# Patient Record
Sex: FEMALE | NOT HISPANIC OR LATINO | Employment: UNEMPLOYED | URBAN - METROPOLITAN AREA
[De-identification: names, ages, dates, MRNs, and addresses within clinical notes are randomized per-mention and may not be internally consistent; named-entity substitution may affect disease eponyms.]

---

## 2018-01-23 ENCOUNTER — OFFICE VISIT CONVERTED (OUTPATIENT)
Dept: CARDIOLOGY | Facility: CLINIC | Age: 38
End: 2018-01-23
Attending: SPECIALIST

## 2018-03-30 ENCOUNTER — OFFICE VISIT CONVERTED (OUTPATIENT)
Dept: FAMILY MEDICINE CLINIC | Facility: CLINIC | Age: 38
End: 2018-03-30
Attending: FAMILY MEDICINE

## 2018-06-08 ENCOUNTER — CONVERSION ENCOUNTER (OUTPATIENT)
Dept: FAMILY MEDICINE CLINIC | Facility: CLINIC | Age: 38
End: 2018-06-08

## 2018-06-08 ENCOUNTER — OFFICE VISIT CONVERTED (OUTPATIENT)
Dept: FAMILY MEDICINE CLINIC | Facility: CLINIC | Age: 38
End: 2018-06-08
Attending: FAMILY MEDICINE

## 2019-03-13 ENCOUNTER — HOSPITAL ENCOUNTER (OUTPATIENT)
Dept: GENERAL RADIOLOGY | Facility: HOSPITAL | Age: 39
Discharge: HOME | End: 2019-03-13
Attending: OBSTETRICS & GYNECOLOGY

## 2021-03-16 ENCOUNTER — CONVERSION ENCOUNTER (OUTPATIENT)
Dept: GASTROENTEROLOGY | Facility: CLINIC | Age: 41
End: 2021-03-16

## 2021-03-16 ENCOUNTER — OFFICE VISIT CONVERTED (OUTPATIENT)
Dept: GASTROENTEROLOGY | Facility: CLINIC | Age: 41
End: 2021-03-16
Attending: INTERNAL MEDICINE

## 2021-03-30 ENCOUNTER — HOSPITAL ENCOUNTER (OUTPATIENT)
Dept: GASTROENTEROLOGY | Facility: HOSPITAL | Age: 41
Setting detail: HOSPITAL OUTPATIENT SURGERY
Discharge: HOME | End: 2021-03-30
Attending: INTERNAL MEDICINE

## 2021-03-30 LAB
GLUCOSE BLD-MCNC: 121 MG/DL (ref 65–99)
HCG UR QL: NEGATIVE

## 2021-05-07 NOTE — PROGRESS NOTES
Progress Note      Patient Name: Jackie Watson   Patient ID: [id number]   Sex: Female   YOB: 1980        Visit Date: June 8, 2018    Provider: Jazmin Marie MD   Location: [facility]   Location Address: [address] [place] [address]  [id number]   Location Phone: [phone]          Chief Complaint     Patient wanting to discuss an increase in her Celexa dose following the death of her father       History Of Present Illness  Jackie Watson is a 37 year old /White female who presents for evaluation and treatment of:      Medication change    Patient comes in for increased anxiety and depression for the past 2 weeks. She lost her dad 1week ago but the week before he was in the ICU. She notes mostly more anxiety but also an increase in depression. Denies manic episodes. Denies SI/HI. Denies panic attacks. She has had intentional weight loss of about 40# since her gastric sleeve surgery in April.          Past Medical History  Disease Name Date Onset Notes   Depression with anxiety --  --    Diabetes --  --    Seasonal allergies --  --          Past Surgical History  Procedure Name Date Notes   Gallbladder removal --  when patient was 15         Medication List  Name Date Started Instructions   citalopram 20 mg oral tablet 03/30/2018 take 1 tablet (20 mg) by oral route once daily for anxiety   levocetirizine 5 mg oral tablet 03/30/2018 take 1 tablet (5 mg) by oral route once daily for allergies   montelukast 10 mg oral tablet 03/30/2018 take 1 tablet (10 mg) by oral route once daily for your allergies   Novolog Flexpen 100 unit/mL subcutaneous insulin pen  inject by subcutaneous route per prescriber's instructions. Insulin dosing requires individualization.   Tresiba FlexTouch U-100 100 unit/mL (3 mL) subcutaneous insulin pen  inject by subcutaneous route as per insulin protocol         Allergy List  Allergen Name Date Reaction Notes   NO KNOWN DRUG ALLERGIES  "--  --  --          Social History  Finding Status Start/Stop Quantity Notes   Tobacco Former 16/34 <1 pack per day Quit 5 years ago         Review of Systems  · Constitutional  o Denies  o : fever, headache, chills  · Eyes  o Denies  o : eye pain, changes in vision, double vision  · HENT  o Denies  o : nasal congestion, nasal discharge, postnasal drip, sore throat, tinnitus, ear pain  · Breasts  o Denies  o : lumps  · Cardiovascular  o Denies  o : chest pain, palpitations  · Respiratory  o Denies  o : shortness of breath, wheezing, cough  · Gastrointestinal  o Denies  o : nausea, vomiting, abdominal pain, blood in stools  · Genitourinary  o Denies  o : urgency, frequency, dysuria, hematuria  · Integument  o Denies  o : rash  · Psychiatric  o Admits  o : anxiety, depression  o Denies  o : difficulty sleeping, impulsive behaviors, suicidal ideation, homicidal ideation      Vitals  Date Time BP Position Site L\R Cuff Size HR RR TEMP(F) WT  HT  BMI kg/m2 BSA m2 O2 Sat        06/08/2018 09:02 /84 Sitting    82 - R  98.3 295lbs 0oz 5'  7\" 46.2 2.52 96 %           Physical Examination  · Constitutional  o Appearance  o : well developed, well-nourished, no acute distress  · Eyes  o Conjunctivae  o : conjunctivae normal  · Ears, Nose, Mouth and Throat  o Nose  o :   § External Nose  § : no lesions noted  · Neck  o Inspection/Palpation  o : normal appearance, trachea midline, neck supple  · Respiratory  o Respiratory Effort  o : breathing unlabored  o Inspection of Chest  o : chest rise symmetric bilaterally  o Auscultation of Lungs  o : clear to auscultation bilaterally throughout inspiration and expiration; no wheezes, rhonchi, rales, rubs  · Cardiovascular  o Heart  o :   § Auscultation of Heart  § : regular rate and rhythm, no murmurs, gallops or rubs  · Psychiatric  o Judgement and Insight  o : judgement and insight intact  o Thought Processes  o : rate of thoughts normal, thought content logical  o Mood and " Affect  o : mood normal, affect appropriate  o Presence of Abnormal Thoughts  o : no homicidal ideation, no suicidal ideation              Assessment  · Depression with anxiety     300.4/F41.8  PHQ9 and GAD7 scores both higher this visit. Discussed that grief reaction can last up to 6 weeks. Will increase celexa during that time and re-evaluate in one month. Patient denies SI/HI.       Plan  · Orders  o ACO-39: Current medications updated and reviewed () - - 06/08/2018  · Medications  o citalopram 40 mg oral tablet   SIG: take 1 tablet (40 mg) by oral route once daily for anxiety/depression   DISP: (30) tablets with 0 refills  Prescribed on 06/08/2018     o citalopram 20 mg oral tablet   SIG: take 1 tablet (20 mg) by oral route once daily for anxiety   DISP: (30) tablet with 5 refills  Discontinued on 06/08/2018     · Instructions  o Patient was educated/instructed on their diagnosis, treatment and medications prior to discharge from the clinic today.  · Disposition  o Call or Return if symptoms worsen or persist.            Electronically Signed by: Jazmin Marie MD -Author on June 8, 2018 09:42:57 AM

## 2021-05-07 NOTE — PROGRESS NOTES
Progress Note      Patient Name: Jackie Watson   Patient ID: [id number]   Sex: Female   YOB: 1980        Visit Date: March 30, 2018    Provider: Jazmin Marie MD   Location: [facility]   Location Address: [address] [place] [address]  [id number]   Location Phone: [phone]          Chief Complaint     Medication refills       History Of Present Illness  Jackie Watson is a 37 year old /White female who presents for evaluation and treatment of:      Rx refills    Patient comes in for a refill on celexa for anxiety and xyzal and singulair for her allergies. She has been on celexa about 3 years. No SI/HI. Getting allergy shots so hoping to stop allergy meds this summer after allergy shots are completed. She will be getting the gastric sleeve on 18Apr2018.         Past Medical History  Disease Name Date Onset Notes   Depression with anxiety --  --    Diabetes --  --    Seasonal allergies --  --          Past Surgical History  Procedure Name Date Notes   Gallbladder removal --  when patient was 15         Medication List  Name Date Started Instructions   citalopram 20 mg oral tablet 03/30/2018 take 1 tablet (20 mg) by oral route once daily for anxiety   levocetirizine 5 mg oral tablet 03/30/2018 take 1 tablet (5 mg) by oral route once daily for allergies   lisinopril 10 mg oral tablet  take 0.5 tablet (5 mg) by oral route once daily   metformin 500 mg oral tablet  take 1 tablet (500 mg) by oral route 2 times per day with morning and evening meals   montelukast 10 mg oral tablet 03/30/2018 take 1 tablet (10 mg) by oral route once daily for your allergies   Novolog Flexpen 100 unit/mL subcutaneous insulin pen  inject by subcutaneous route per prescriber's instructions. Insulin dosing requires individualization.   pravastatin 10 mg oral tablet  take 1 tablet (10 mg) by oral route once daily   Tamiflu 75 mg oral capsule 02/05/2018 take 1 capsule (75 mg) by oral  route once daily for 10 days   Tresiba FlexTouch U-100 100 unit/mL (3 mL) subcutaneous insulin pen  inject by subcutaneous route as per insulin protocol         Allergy List  Allergen Name Date Reaction Notes   NO KNOWN DRUG ALLERGIES --  --  --          Social History  Finding Status Start/Stop Quantity Notes   Tobacco Former 16/34 <1 pack per day Quit 5 years ago         Review of Systems  · Constitutional  o Denies  o : fever, headache, chills  · Eyes  o Denies  o : eye pain, changes in vision, double vision  · HENT  o Denies  o : nasal congestion, nasal discharge, postnasal drip, sore throat, tinnitus, ear pain  · Breasts  o Denies  o : lumps  · Cardiovascular  o Denies  o : chest pain, palpitations  · Respiratory  o Denies  o : shortness of breath, wheezing, cough  · Gastrointestinal  o Denies  o : nausea, vomiting, abdominal pain, blood in stools  · Genitourinary  o Denies  o : urgency, frequency, dysuria, hematuria  · Integument  o Denies  o : rash  · Psychiatric  o Denies  o : suicidal ideation, homicidal ideation      Vitals  Date Time BP Position Site L\R Cuff Size HR RR TEMP(F) WT  HT  BMI kg/m2 BSA m2 O2 Sat HC       03/30/2018 08:59 /82 Sitting    94 - R  98.2 332lbs 0oz    96 %           Physical Examination  · Constitutional  o Appearance  o : well developed, well-nourished, no acute distress  · Eyes  o Conjunctivae  o : conjunctivae normal  · Ears, Nose, Mouth and Throat  o Nose  o :   § External Nose  § : no lesions noted  · Neck  o Inspection/Palpation  o : normal appearance, trachea midline, neck supple  · Respiratory  o Respiratory Effort  o : breathing unlabored  o Inspection of Chest  o : chest rise symmetric bilaterally  o Auscultation of Lungs  o : clear to auscultation bilaterally throughout inspiration and expiration; no wheezes, rhonchi, rales, rubs  · Cardiovascular  o Heart  o :   § Auscultation of Heart  § : regular rate and rhythm, no murmurs, gallops or  rubs  · Psychiatric  o Mood and Affect  o : mood normal, affect appropriate              Assessment  · Depression with anxiety     300.4/F41.8  GAD7: 15 before celexa and 8 with celexa. Well controlled and will refill medication. She denies SI/HI.  · Seasonal allergies     477.9/J30.2  Will refill meds.       Plan  · Orders  o ACO-39: Current medications updated and reviewed () - - 03/30/2018  · Instructions  o Patient was educated/instructed on their diagnosis, treatment and medications prior to discharge from the clinic today.            Electronically Signed by: Jazmin Marie MD -Author on March 30, 2018 09:29:44 AM

## 2021-05-09 VITALS
BODY MASS INDEX: 45.99 KG/M2 | DIASTOLIC BLOOD PRESSURE: 84 MMHG | HEART RATE: 82 BPM | WEIGHT: 293 LBS | HEIGHT: 67 IN | OXYGEN SATURATION: 96 % | TEMPERATURE: 98.3 F | SYSTOLIC BLOOD PRESSURE: 124 MMHG

## 2021-05-09 VITALS
DIASTOLIC BLOOD PRESSURE: 82 MMHG | WEIGHT: 293 LBS | OXYGEN SATURATION: 96 % | TEMPERATURE: 98.2 F | SYSTOLIC BLOOD PRESSURE: 124 MMHG | HEART RATE: 94 BPM

## 2021-05-10 NOTE — H&P
History and Physical      Patient Name: Jackie Watson   Patient ID: [id number]   Sex: Female   YOB: 1980    Primary Care Provider: Bhargavi Rogers MD   Referring Provider: Bhargavi Rogers MD    Visit Date: March 16, 2021    Provider: Chandler Cordero MD   Location: [facility]  [place]   Location Address: [address] [place] [address]  [id number]   Location Phone: [phone]          Chief Complaint  · Gastroesophageal reflux     Prior gastric sleeve  Nocturnal regurgitation       History Of Present Illness  The patient is a 40 year old /White female, who presents on referral from Bhargavi Rogers MD, for a gastroenterology evaluation of GERD. She describes episodes of heartburn and sour eructations, which began 6 months ago. The patient's heartburn is exacerbated by spicy food and lying down at night, and when it occurs it lasts a variable amount of time. It is improved by PPI's and Liquid Anti-acids. The patient is overweight. The patient has been on therapy for Acid Reflux. They have taken Protonix. It has been partially successful in relieving their symptoms.   Radiographic Studies:  She has undergone no recent radiographic imaging.   Diagnostic studies /Procedures:  Relevant procedures performed to date: none.      The patient had gastric sleeve done in 2018 and lost significant amount of weight however in the recent quarantine she has gained back about 25 pounds.  Her reflux symptoms started about 6 months ago and in the past month has been extremely worse.  She is now having to sit in the recliner to try to sleep.  Otherwise she describes nocturnal regurgitation of acid contents almost nightly.    The patient has a long history of pancreatic pseudocyst as a 15-year-old secondary to gallstone pancreatitis.  She has had multiple ERCPs in the past with stent placement.  She now has diabetes.       Past Medical History  Body mass index 45.0-49.9, adult; Diabetes; Morbid  "obesity         Past Surgical History  Cervical cone biopsy; Cholecstectomy; EGD; ERCP; Sleeve gastrectomy using single incision laparoscopic surgery (SILS)         Medication List  Celexa 40 mg oral tablet; Pepcid 40 mg oral tablet; Protonix 40 mg oral tablet,delayed release (DR/EC); Topamax 100 mg oral tablet; Wellbutrin  mg oral tablet sustained-release 12 hr         Allergy List  NO KNOWN DRUG ALLERGIES       Allergies Reconciled  Family Medical History  Breast Neoplasm, Malignant; Heart Disease; Asthma; Chronic Obstructive Pulmonary Disease; - No Family History of Colorectal Cancer         Social History  Alcohol (Never); Tobacco (Former)         Review of Systems  · Constitutional  o Denies  o : chills, fever  · Eyes  o Denies  o : blurred vision, vision changes  · Cardiovascular  o Denies  o : exertional chest pain  · Respiratory  o Denies  o : shortness of breath  · Gastrointestinal  o Denies  o : nausea, vomiting  · Genitourinary  o Denies  o : dysuria, blood in urine  · Integument  o Denies  o : skin rash  · Neurologic  o Denies  o : numbness or tingling  · Musculoskeletal  o Denies  o : joint pain  · Endocrine  o Denies  o : weight gain, weight loss  · Psychiatric  o Denies  o : anxiety, depression      Vitals  Date Time BP Position Site L\R Cuff Size HR RR TEMP (F) WT  HT  BMI kg/m2 BSA m2 O2 Sat FR L/min FiO2 HC       03/16/2021 02:17 /89 Sitting    87 - R 12  265lbs 2oz 5'  6.5\" 42.15 2.38 99 %            Physical Examination  · Constitutional  o Appearance  o : Well-nourished, well developed, alert, in no acute distress, alert and oriented X 3.  · Eyes  o Vision  o :   § Visual Fields  § : eyes move symmetrical in all directions  o Sclerae  o : sclerae anicteric  o Pupils and Irises  o : pupils equal and symetrical  · Neck  o Inspection/Palpation  o : Trachea is midline, no adenopathy  o Thyroid  o : Thyroid is not enlarged  · Respiratory  o Respiratory Effort  o : Breathing is " unlabored.  o Inspection of Chest  o : normal appearance, no retractions  o Auscultation of Lungs  o : Chest is clear to auscultation bilaterally.  · Cardiovascular  o Heart  o :   § Auscultation of Heart  § : no murmurs, rubs, or gallops. Regular rate and rhythm  · Gastrointestinal  o Abdominal Examination  o : Abdomen is soft, nontender to palpation, with normal active bowel sounds, no appreciable hepatosplenomegaly.  · Genitourinary  o Digital Rectal Examination  o : Deferred  · Musculoskeletal  o Appearance  o : Gait is normal. There is no clubbing, cyanosis or edema.   · Skin and Subcutaneous Tissue  o General Inspection  o : Skin is without focal lesions. Skin turgor is normal.  · Psychiatric  o Mood and Affect  o : Mood and affect are appropriate to circumstances.          Assessment  · Esophageal Reflux     530.81/K21.9      Plan  · Orders  o Consent for Esophagogastrodudodenoscopy (EGD) with dilatation -Possible risk/complications, benefits, and alternatives to surgical or invasive procedure have been explained to patient and/or legal gaurdian. -Patient has been evaluated and can tolerate anesthesia and/or sedation. Risk, benefits, and alternatives to anesthesia and sedation have been explained to patient and/or legal gaurdian. ([id number]) - - 03/16/2021  · Medications  o Medications have been Reconciled  o Transition of Care or Provider Policy  · Instructions  o Patient had gastric sleeve in 2018 with Dr. Jasmine however in the past 6 months she has had significant reflux symptoms and even nocturnal regurgitation of gastric contents. She was started on Protonix taking it at night as well as Pepcid but has not had significant improvement after 1 month of therapy. Some of her symptoms may be attributed to a recent 25 pound weight regain. I will therefore schedule her for an upper endoscopy. I recommended she take Protonix first thing in the morning and changed to Pepcid 40 mg at night. We discussed resuming her  postsurgical diet.            Electronically Signed by: Chandler Cordero MD -Author on March 16, 2021 03:03:45 PM

## 2021-05-14 VITALS
HEART RATE: 87 BPM | HEIGHT: 66 IN | BODY MASS INDEX: 42.61 KG/M2 | RESPIRATION RATE: 12 BRPM | OXYGEN SATURATION: 99 % | DIASTOLIC BLOOD PRESSURE: 89 MMHG | WEIGHT: 265.12 LBS | SYSTOLIC BLOOD PRESSURE: 116 MMHG

## 2021-05-16 VITALS
HEART RATE: 92 BPM | SYSTOLIC BLOOD PRESSURE: 136 MMHG | HEIGHT: 66 IN | BODY MASS INDEX: 47.09 KG/M2 | WEIGHT: 293 LBS | DIASTOLIC BLOOD PRESSURE: 96 MMHG

## 2022-05-16 ENCOUNTER — OFFICE VISIT (OUTPATIENT)
Dept: OBSTETRICS AND GYNECOLOGY | Facility: CLINIC | Age: 42
End: 2022-05-16

## 2022-05-16 VITALS
HEART RATE: 88 BPM | SYSTOLIC BLOOD PRESSURE: 134 MMHG | BODY MASS INDEX: 43.71 KG/M2 | DIASTOLIC BLOOD PRESSURE: 89 MMHG | HEIGHT: 66 IN | WEIGHT: 272 LBS

## 2022-05-16 DIAGNOSIS — Z01.419 WELL WOMAN EXAM: Primary | ICD-10-CM

## 2022-05-16 DIAGNOSIS — Z12.31 ENCOUNTER FOR SCREENING MAMMOGRAM FOR MALIGNANT NEOPLASM OF BREAST: ICD-10-CM

## 2022-05-16 PROCEDURE — 87624 HPV HI-RISK TYP POOLED RSLT: CPT | Performed by: NURSE PRACTITIONER

## 2022-05-16 PROCEDURE — 99396 PREV VISIT EST AGE 40-64: CPT | Performed by: NURSE PRACTITIONER

## 2022-05-16 PROCEDURE — G0123 SCREEN CERV/VAG THIN LAYER: HCPCS | Performed by: NURSE PRACTITIONER

## 2022-05-16 RX ORDER — DEXTROAMPHETAMINE SACCHARATE, AMPHETAMINE ASPARTATE MONOHYDRATE, DEXTROAMPHETAMINE SULFATE AND AMPHETAMINE SULFATE 5; 5; 5; 5 MG/1; MG/1; MG/1; MG/1
20 CAPSULE, EXTENDED RELEASE ORAL EVERY MORNING
COMMUNITY
Start: 2022-04-28

## 2022-05-16 RX ORDER — PANTOPRAZOLE SODIUM 40 MG/1
40 TABLET, DELAYED RELEASE ORAL DAILY
COMMUNITY
Start: 2022-03-29

## 2022-05-16 RX ORDER — FLUTICASONE PROPIONATE 50 MCG
SPRAY, SUSPENSION (ML) NASAL
COMMUNITY
Start: 2022-03-22

## 2022-05-16 RX ORDER — INSULIN DEGLUDEC INJECTION 100 U/ML
INJECTION, SOLUTION SUBCUTANEOUS
COMMUNITY
Start: 2022-02-17

## 2022-05-16 RX ORDER — BUPROPION HYDROCHLORIDE 150 MG/1
150 TABLET, EXTENDED RELEASE ORAL EVERY MORNING
COMMUNITY
Start: 2022-03-29

## 2022-05-16 RX ORDER — TIZANIDINE 4 MG/1
4 TABLET ORAL EVERY 8 HOURS PRN
COMMUNITY
Start: 2022-03-17

## 2022-05-16 RX ORDER — CITALOPRAM 40 MG/1
40 TABLET ORAL DAILY
COMMUNITY
Start: 2022-03-29

## 2022-05-16 RX ORDER — SEMAGLUTIDE 1.34 MG/ML
INJECTION, SOLUTION SUBCUTANEOUS
COMMUNITY
Start: 2022-04-21

## 2022-05-16 RX ORDER — INSULIN ASPART 100 [IU]/ML
INJECTION, SOLUTION INTRAVENOUS; SUBCUTANEOUS
COMMUNITY

## 2022-05-16 RX ORDER — FAMOTIDINE 40 MG/1
40 TABLET, FILM COATED ORAL
COMMUNITY
Start: 2022-03-29

## 2022-05-16 NOTE — PROGRESS NOTES
"  HPI:   41 y.o..Presents for well woman exam. Contraception or HRT: Contraception:  Vasectomy   Menses:   q month, lasts 4 days, changes products q 2 hrs on heaviest days.   Pain:  Mild, OTC meds control discomfort  Last pap normal   Complaints: Pt has no complaints today.  Patient reports that she is not currently experiencing any symptoms of urinary incontinence.      Past Medical History:   Diagnosis Date   • Acid reflux    • ADD (attention deficit disorder)    • Anxiety    • Depression    • Type 1 diabetes (HCC)       Past Surgical History:   Procedure Laterality Date   • CERVICAL BIOPSY  W/ LOOP ELECTRODE EXCISION         • GASTRIC SLEEVE LAPAROSCOPIC     • NECK SURGERY        Family History   Problem Relation Age of Onset   • Breast cancer Mother 46   • Uterine cancer Maternal Grandmother 72        PCP: does manage PMHx and preventative labs    PHYSICAL EXAM: Chaperone present /89   Pulse 88   Ht 167.6 cm (66\")   Wt 123 kg (272 lb)   LMP 2022   Breastfeeding No   BMI 43.90 kg/m²   General- NAD, alert and oriented, appropriate  Psych- Normal mood, good memory  Neck- No masses, no thyroid enlargement  Lymphatic- No palpable neck, axillary, or groin nodes  CV- Regular rhythm, no murmurs  Resp- CTA to bases, no wheezes  Abdomen- Soft, non distended, non tender, no masses  Breast left-  Bilaterally symmetrical, no masses, non tender, no nipple discharge  Breast right- Bilaterally symmetrical, no masses, non tender, no nipple discharge  External genitalia- Normal female, no lesions  Urethra/meatus- Normal, no masses, non tender, no prolapse  Bladder- Normal, no masses, non tender, no prolapse  Vagina- Normal, no atrophy, no lesions, no discharge, no prolapse  Cvx- Normal, no lesions, no discharge, No cervical motion tenderness  Uterus- Normal size, shape & consistency.  Non tender, mobile, & no prolapse  Adnexa- No mass, non tender  Anus/Rectum/Perineum- Not performed  Ext- No edema, no " cyanosis    Skin- No lesions, no rashes, no acanthosis nigricans       ASSESSMENT and PLAN:    Diagnoses and all orders for this visit:    1. Well woman exam (Primary)  -     [facility] Hereditary Cancer Screen  -     IgP, Aptima HPV    2. Encounter for screening mammogram for malignant neoplasm of breast  -     Mammo Screening Digital Tomosynthesis Bilateral With CAD; Future        Preventative:   BREAST HEALTH- Monthly self breast exam importance and how to reviewed. MMG and/or MRI (prn) reviewed per society guidelines and her individual history. Screen: Updated today  CERVICAL CANCER Screening- Reviewed current ASCCP guidelines for screening w and wo cotest HPV, age specific.  Screen: Updated today, desires to continue annual PAP  COLON CANCER Screening- Reviewed current medical society guidelines and options.  Screen:  Not medically needed  Follow up PCP/Specialist PMHx and Labs  [facility]: Counseled and evaluated for genetic testing.  Testing accepted.      She understands the importance of having any ordered tests to be performed in a timely fashion.  The risks of not performing them include, but are not limited to, advanced cancer stages, bone loss from osteoporosis and/or subsequent increase in morbidity and/or mortality.  She is encouraged to review her results online and/or contact or office if she has questions.     Follow Up:  Return for 6 wks [employer].        Radhika Johnson, APRN  05/16/2022

## 2022-05-19 ENCOUNTER — TRANSCRIBE ORDERS (OUTPATIENT)
Dept: ADMINISTRATIVE | Facility: HOSPITAL | Age: 42
End: 2022-05-19

## 2022-05-19 DIAGNOSIS — Z12.31 SCREENING MAMMOGRAM FOR HIGH-RISK PATIENT: Primary | ICD-10-CM

## 2022-05-21 LAB
CYTOLOGIST CVX/VAG CYTO: NORMAL
CYTOLOGY CVX/VAG DOC CYTO: NORMAL
CYTOLOGY CVX/VAG DOC THIN PREP: NORMAL
DX ICD CODE: NORMAL
HIV 1 & 2 AB SER-IMP: NORMAL
HPV I/H RISK 4 DNA CVX QL PROBE+SIG AMP: NEGATIVE
OTHER STN SPEC: NORMAL
STAT OF ADQ CVX/VAG CYTO-IMP: NORMAL

## 2022-05-24 ENCOUNTER — HOSPITAL ENCOUNTER (OUTPATIENT)
Dept: MAMMOGRAPHY | Facility: HOSPITAL | Age: 42
Discharge: HOME | End: 2022-05-24
Admitting: NURSE PRACTITIONER

## 2022-05-24 DIAGNOSIS — Z12.31 SCREENING MAMMOGRAM FOR HIGH-RISK PATIENT: ICD-10-CM

## 2022-05-24 PROCEDURE — 77067 SCR MAMMO BI INCL CAD: CPT

## 2022-05-31 LAB — REF LAB TEST METHOD: NORMAL

## 2022-08-01 ENCOUNTER — TELEPHONE (OUTPATIENT)
Dept: OBSTETRICS AND GYNECOLOGY | Facility: CLINIC | Age: 42
End: 2022-08-01

## 2022-08-01 ENCOUNTER — OFFICE VISIT (OUTPATIENT)
Dept: OBSTETRICS AND GYNECOLOGY | Facility: CLINIC | Age: 42
End: 2022-08-01

## 2022-08-01 VITALS
DIASTOLIC BLOOD PRESSURE: 92 MMHG | WEIGHT: 257 LBS | SYSTOLIC BLOOD PRESSURE: 130 MMHG | BODY MASS INDEX: 41.48 KG/M2 | HEART RATE: 102 BPM

## 2022-08-01 DIAGNOSIS — Z91.89 INCREASED RISK OF BREAST CANCER: Primary | ICD-10-CM

## 2022-08-01 DIAGNOSIS — R03.0 ELEVATED BLOOD PRESSURE READING: ICD-10-CM

## 2022-08-01 PROCEDURE — 99212 OFFICE O/P EST SF 10 MIN: CPT | Performed by: NURSE PRACTITIONER

## 2022-08-01 NOTE — PROGRESS NOTES
[facility] FU      CC: Follow up genetic testing    HPI: Increased risk, [facility] Lifetime Breast Cancer risk 21.4%, TC Lifetime Breast Cancer risk 21.7%    PHYSICAL EXAM:  /92   Pulse 102   Wt 117 kg (257 lb)   LMP 07/17/2022 (Exact Date)   BMI 41.48 kg/m²   General- NAD, alert and oriented, appropriate  Psych- Normal mood, good memory    ASSESSMENT and PLAN:    Diagnoses and all orders for this visit:    1. Increased risk of breast cancer (Primary)    2. Elevated blood pressure reading      Counseling: Refer tp myriad report.  Copy of results and Plan provided. Results and any recommendations for FU and/or early detection and/or prevention reviewed. Patient concerns were discussed and answered. If VUS present reviewed importance of keeping active POC if status changes.  FU PCP elevated blood pressure        Follow Up:  Return if symptoms worsen or fail to improve.    I spent 15 minutes caring for Jackie on this date of service. This time includes time spent by me in the following activities:reviewing tests, counseling and educating the patient/family/caregiver, ordering medications, tests, or procedures and documenting information in the medical record        ANTONINA Villalta  08/01/2022

## 2022-08-15 ENCOUNTER — TELEPHONE (OUTPATIENT)
Dept: OBSTETRICS AND GYNECOLOGY | Facility: CLINIC | Age: 42
End: 2022-08-15

## 2023-05-16 NOTE — PROGRESS NOTES
Well Woman Visit    CC: Scheduled annual well gyn visit  Chief Complaint   Patient presents with   • WWE       [facility] intake in the past?: yes 22  Increased risk, [facility] Lifetime Breast Cancer risk 21.4%, TC Lifetime Breast Cancer risk 21.7%    HPI:   42 y.o.   Social History     Substance and Sexual Activity   Sexual Activity Yes   • Partners: Male   • Birth control/protection: Vasectomy    Comment: VAS.       43 y/o  with LMP 23 here for AP.  Menses q mo x 3-4 days with 1 day heavy.  Needs mammogram.  Needs colonocsopy  Pt has no complaints today.    PCP: does manage PMHx and preventative labs  History: PMHx, Meds, Allergies, PSHx, Social Hx, and POBHx all reviewed and updated.    PHYSICAL EXAM:  /85   Pulse 86   Wt 110 kg (242 lb)   LMP 2023 (Approximate)   Breastfeeding No   BMI 39.06 kg/m²  Not found.  General- NAD, alert and oriented, appropriate  Psych- Normal mood, good memory  Neck- No masses, no thyroid enlargement  CV- Regular rhythm, no murnurs  Resp- CTA to bases, no wheezes  Abdomen- Soft, non distended, non tender, no masses    Breast left-  Bilaterally symmetrical, no masses, non tender, no nipple discharge, no axillary or supraclavicular nodes palpable  Breast right- Bilaterally symmetrical, no masses, non tender, no nipple discharge, no axillary or supraclavicular nodes palpable    External genitalia- Normal female, no lesions  Urethra/meatus- Normal, no masses, non tender  Bladder- Normal, no masses, non tender  Vagina- Normal, no atrophy, no lesions, no discharge.   Cvx- Normal, no lesions, no discharge, No cervical motion tenderness  Uterus- Normal size, shape & consistency.  Non tender, mobile, & no prolapse  Adnexa- No mass, non tender  Anus/Rectum/Perineum- Normal appearance, no mass, good sphincter tone, no hemorrhoids, no prolapse, Hemoccult NEGATIVE    Lymphatic- No palpable neck, axillary, or groin nodes  Ext- No edema, no cyanosis    Skin- No  lesions, no rashes, no acanthosis nigricans      ASSESSMENT and PLAN:    Diagnoses and all orders for this visit:    1. Well woman exam with routine gynecological exam (Primary)  -     IgP, Aptima HPV; Future  -     Mammo Screening Digital Tomosynthesis Bilateral With CAD; Future  -     IgP, Aptima HPV    2. Encounter for screening colonoscopy  -     Ambulatory Referral to Gastroenterology        Preventative:  • BREAST HEALTH- Monthly self breast exam importance and how to reviewed. MMG and/or MRI (prn) reviewed per society guidelines and her individual history. Screen: Updated today  • CERVICAL CANCER Screening- Reviewed current ASCCP guidelines for screening w and wo cotest HPV, age specific.  Screen: Updated today  • COLON CANCER Screening- Reviewed current medical society guidelines and options.  Screen:  Updated today  • Follow up PCP/Specialist PMHx and Labs  [facility]: Testing accepted.      She understands the importance of having any ordered tests to be performed in a timely fashion.  The risks of not performing them include, but are not limited to, advanced cancer stages, bone loss from osteoporosis and/or subsequent increase in morbidity and/or mortality.  She is encouraged to review her results online and/or contact or office if she has questions.     Follow Up:  Return in about 1 year (around 5/18/2024) for Annual physical.            Jennyfer Parikh, DO  05/18/2023    [facility]  [address] [place] [address]  Dept: [phone]  Dept Fax: [phone]  Loc: [phone]  Loc Fax: [phone]

## 2023-05-18 ENCOUNTER — OFFICE VISIT (OUTPATIENT)
Dept: OBSTETRICS AND GYNECOLOGY | Facility: CLINIC | Age: 43
End: 2023-05-18
Payer: COMMERCIAL

## 2023-05-18 VITALS
WEIGHT: 242 LBS | DIASTOLIC BLOOD PRESSURE: 85 MMHG | SYSTOLIC BLOOD PRESSURE: 127 MMHG | BODY MASS INDEX: 39.06 KG/M2 | HEART RATE: 86 BPM

## 2023-05-18 DIAGNOSIS — Z01.419 WELL WOMAN EXAM WITH ROUTINE GYNECOLOGICAL EXAM: Primary | ICD-10-CM

## 2023-05-18 DIAGNOSIS — Z12.11 ENCOUNTER FOR SCREENING COLONOSCOPY: ICD-10-CM

## 2023-05-18 PROCEDURE — G0123 SCREEN CERV/VAG THIN LAYER: HCPCS | Performed by: OBSTETRICS & GYNECOLOGY

## 2023-05-18 PROCEDURE — 99396 PREV VISIT EST AGE 40-64: CPT | Performed by: OBSTETRICS & GYNECOLOGY

## 2023-05-18 PROCEDURE — 87624 HPV HI-RISK TYP POOLED RSLT: CPT | Performed by: OBSTETRICS & GYNECOLOGY

## 2023-05-26 LAB
CYTOLOGIST CVX/VAG CYTO: NORMAL
CYTOLOGY CVX/VAG DOC CYTO: NORMAL
CYTOLOGY CVX/VAG DOC THIN PREP: NORMAL
DX ICD CODE: NORMAL
HIV 1 & 2 AB SER-IMP: NORMAL
HPV I/H RISK 4 DNA CVX QL PROBE+SIG AMP: NEGATIVE
Lab: NORMAL
OTHER STN SPEC: NORMAL
STAT OF ADQ CVX/VAG CYTO-IMP: NORMAL

## 2023-08-16 ENCOUNTER — TELEPHONE (OUTPATIENT)
Dept: OBSTETRICS AND GYNECOLOGY | Facility: CLINIC | Age: 43
End: 2023-08-16
Payer: COMMERCIAL

## 2023-08-16 NOTE — TELEPHONE ENCOUNTER
I called the patient and spoke with her in regard to overdue results on her mammogram.  She had missed her appointment and asked if I would get it rescheduled.  She has been rescheduled for 09/15/23.

## 2023-09-13 ENCOUNTER — TELEPHONE (OUTPATIENT)
Dept: ORTHOPEDIC SURGERY | Facility: CLINIC | Age: 43
End: 2023-09-13
Payer: COMMERCIAL

## 2023-09-15 ENCOUNTER — OFFICE VISIT (OUTPATIENT)
Dept: ORTHOPEDIC SURGERY | Facility: CLINIC | Age: 43
End: 2023-09-15
Payer: COMMERCIAL

## 2023-09-15 ENCOUNTER — HOSPITAL ENCOUNTER (OUTPATIENT)
Dept: MAMMOGRAPHY | Facility: HOSPITAL | Age: 43
Discharge: HOME | End: 2023-09-15
Admitting: OBSTETRICS & GYNECOLOGY
Payer: COMMERCIAL

## 2023-09-15 VITALS
HEART RATE: 85 BPM | HEIGHT: 66 IN | WEIGHT: 241 LBS | BODY MASS INDEX: 38.73 KG/M2 | OXYGEN SATURATION: 98 % | DIASTOLIC BLOOD PRESSURE: 82 MMHG | SYSTOLIC BLOOD PRESSURE: 116 MMHG

## 2023-09-15 DIAGNOSIS — Z01.419 WELL WOMAN EXAM WITH ROUTINE GYNECOLOGICAL EXAM: ICD-10-CM

## 2023-09-15 DIAGNOSIS — M79.672 LEFT FOOT PAIN: Primary | ICD-10-CM

## 2023-09-15 DIAGNOSIS — S92.352A CLOSED DISPLACED FRACTURE OF FIFTH METATARSAL BONE OF LEFT FOOT, INITIAL ENCOUNTER: ICD-10-CM

## 2023-09-15 PROCEDURE — 77063 BREAST TOMOSYNTHESIS BI: CPT

## 2023-09-15 PROCEDURE — 77067 SCR MAMMO BI INCL CAD: CPT

## 2023-09-15 RX ORDER — TRAZODONE HYDROCHLORIDE 100 MG/1
100 TABLET ORAL 2 TIMES DAILY
COMMUNITY

## 2023-09-15 NOTE — PROGRESS NOTES
"Chief Complaint  Follow-up of the Left Foot     Subjective      Jackie Watson presents to [facility] for follow up of the left foot. She had a fifth metatarsal fracture and in a CAM boot. She was standing and lost her balance and twisted her foot and fell.  She went on had X rays at [facility] and placed in a CAM boot.  She had the injury 3 weeks ago.  She notes she has limited pain.      No Known Allergies     Social History     Socioeconomic History    Marital status:     Number of children: 1   Tobacco Use    Smoking status: Never     Passive exposure: Never    Smokeless tobacco: Never   Vaping Use    Vaping Use: Never used   Substance and Sexual Activity    Alcohol use: Yes     Comment: Occassionally    Drug use: Never    Sexual activity: Yes     Partners: Male     Birth control/protection: Vasectomy     Comment: VAS.        I reviewed the patient's chief complaint, history of present illness, review of systems, past medical history, surgical history, family history, social history, medications, and allergy list.     Review of Systems     Constitutional: Denies fevers, chills, weight loss  Cardiovascular: Denies chest pain, shortness of breath  Skin: Denies rashes, acute skin changes  Neurologic: Denies headache, loss of consciousness        Vital Signs:   /82   Pulse 85   Ht 167.6 cm (66\")   Wt 109 kg (241 lb)   SpO2 98%   BMI 38.90 kg/m²          Physical Exam  General: Alert. No acute distress    Ortho Exam        LEFT FOOT Positive EHL, FHL, GS and TA. Sensation intact to all 5 nerves of the foot. Positive pulses. Neurovascularly intact. Calf soft, Non-tender. Plantar flexion 20, dorsiflexion 10. Stable to stress. Intact flexion and extension of toes.        Procedures      Imaging Results (Most Recent)       None             Result Review :     Her X rays were brought on CD and shows routine healing of the 5 th metatarsal fracture.  No displacement. "         Assessment and Plan     Diagnoses and all orders for this visit:    1. Left foot pain (Primary)    2. Closed displaced fracture of fifth metatarsal bone of left foot, initial encounter        Discussed the treatment plan with the patient.     Continue with CAM boot.  Don't have to sleep with the boot or when sitting can have the boot off.        Will obtain X-Rays of left foot at next visit.    Call or return if worsening symptoms.    Follow Up     2-3 weeks with X ray of the left foot at the next visit.       Patient was given instructions and counseling regarding her condition or for health maintenance advice. Please see specific information pulled into the AVS if appropriate.     Scribed for Jarrett Darnell MD by Leeanne Orourke MA.  09/15/23   08:47 EDT    I have personally performed the services described in this document as scribed by the above individual and it is both accurate and complete. Jarrett Darnell MD 09/15/23

## 2023-10-10 ENCOUNTER — OFFICE VISIT (OUTPATIENT)
Dept: ORTHOPEDIC SURGERY | Facility: CLINIC | Age: 43
End: 2023-10-10
Payer: COMMERCIAL

## 2023-10-10 VITALS
HEIGHT: 66 IN | HEART RATE: 86 BPM | BODY MASS INDEX: 38.73 KG/M2 | WEIGHT: 241 LBS | DIASTOLIC BLOOD PRESSURE: 83 MMHG | OXYGEN SATURATION: 95 % | SYSTOLIC BLOOD PRESSURE: 133 MMHG

## 2023-10-10 DIAGNOSIS — S92.355D CLOSED NONDISPLACED FRACTURE OF FIFTH METATARSAL BONE OF LEFT FOOT WITH ROUTINE HEALING, SUBSEQUENT ENCOUNTER: ICD-10-CM

## 2023-10-10 DIAGNOSIS — M79.672 LEFT FOOT PAIN: Primary | ICD-10-CM

## 2023-10-10 PROCEDURE — 99024 POSTOP FOLLOW-UP VISIT: CPT

## 2023-10-12 NOTE — PATIENT INSTRUCTIONS
X-rays taken and reviewed with patient showing a stable and routinely healing fracture.    Discussed with patient that she may transition into a normal shoe that is supportive, advised against flip-flops or slip on shoes.    May weight-bear as tolerated.    Follow-up in 4 weeks with repeat x-ray.  Call for questions, concerns or worsening symptoms.   [employer]

## 2023-10-12 NOTE — PROGRESS NOTES
"Chief Complaint  Follow-up of the Left Foot    Subjective      Jackie Watson presents to [facility] for follow-up of left fifth metatarsal fracture that occurred early September.  Patient presents in a cam boot.  Reports that she feels 80% better than she did at her last appointment.  Reports that she still experiences swelling at night.  Otherwise doing well.    Objective   No Known Allergies    Vital Signs:   /83   Pulse 86   Ht 167.6 cm (66\")   Wt 109 kg (241 lb)   SpO2 95%   BMI 38.90 kg/mý       Physical Exam    Constitutional: Awake, alert. Well nourished appearance.    Integumentary: Warm, dry, intact. No obvious rashes.    HENT: Atraumatic, normocephalic.   Respiratory: Non labored respirations .   Cardiovascular: Intact peripheral pulses.    Psychiatric: Normal mood and affect. A&O X3    Ortho Exam  Left foot: Full range of motion of the ankle with dorsiflexion, plantarflexion, inversion and eversion.  Nontender to palpation of fracture site.  Full range of motion of the toes.  Sensation is intact.  Neurovascular intact.  Capillary refill time is brisk.    Imaging Results (Most Recent)       Procedure Component Value Units Date/Time    XR Foot 2 View Left [[id number]] Resulted: 10/11/23 0757     Updated: 10/11/23 0757    Narrative:      X-Ray Report:  Study: X-rays ordered, taken in the office, and reviewed today.   Site: Left foot xray  Indication: Fifth metatarsal fracture  View: AP/Lateral view(s)  Findings: Fifth metatarsal fracture with stable alignment and routine   healing  Prior studies available for comparison: yes                       Assessment and Plan   Problem List Items Addressed This Visit    None  Visit Diagnoses       Left foot pain    -  Primary    Relevant Orders    XR Foot 2 View Left (Completed)    Closed nondisplaced fracture of fifth metatarsal bone of left foot with routine healing, subsequent encounter                Follow Up "   Return in about 4 weeks (around 11/7/2023).    Patient is a non-smoker, did not discuss options for smoking cessation.     Social History     Socioeconomic History    Marital status:     Number of children: 1   Tobacco Use    Smoking status: Never     Passive exposure: Never    Smokeless tobacco: Never   Vaping Use    Vaping Use: Never used   Substance and Sexual Activity    Alcohol use: Yes     Comment: Occassionally    Drug use: Never    Sexual activity: Yes     Partners: Male     Birth control/protection: Vasectomy     Comment: VAS.       Patient Instructions   X-rays taken and reviewed with patient showing a stable and routinely healing fracture.    Discussed with patient that she may transition into a normal shoe that is supportive, advised against flip-flops or slip on shoes.    May weight-bear as tolerated.    Follow-up in 4 weeks with repeat x-ray.  Call for questions, concerns or worsening symptoms.  Patient was given instructions and counseling regarding her condition or for health maintenance advice. Please see specific information pulled into the AVS if appropriate.

## 2024-09-23 ENCOUNTER — OFFICE VISIT (OUTPATIENT)
Dept: OBSTETRICS AND GYNECOLOGY | Facility: CLINIC | Age: 44
End: 2024-09-23
Payer: COMMERCIAL

## 2024-09-23 VITALS
HEIGHT: 66 IN | WEIGHT: 270 LBS | HEART RATE: 93 BPM | SYSTOLIC BLOOD PRESSURE: 129 MMHG | BODY MASS INDEX: 43.39 KG/M2 | DIASTOLIC BLOOD PRESSURE: 93 MMHG

## 2024-09-23 DIAGNOSIS — Z01.419 WELL WOMAN EXAM WITH ROUTINE GYNECOLOGICAL EXAM: Primary | ICD-10-CM

## 2024-09-23 DIAGNOSIS — Z12.11 ENCOUNTER FOR SCREENING COLONOSCOPY: ICD-10-CM

## 2024-09-23 PROCEDURE — 99396 PREV VISIT EST AGE 40-64: CPT | Performed by: OBSTETRICS & GYNECOLOGY

## 2024-09-23 PROCEDURE — 99459 PELVIC EXAMINATION: CPT | Performed by: OBSTETRICS & GYNECOLOGY

## 2024-09-23 PROCEDURE — G0123 SCREEN CERV/VAG THIN LAYER: HCPCS | Performed by: OBSTETRICS & GYNECOLOGY

## 2024-09-23 RX ORDER — HYDROXYZINE PAMOATE 100 MG
100 CAPSULE ORAL
COMMUNITY
Start: 2024-08-30

## 2024-09-23 RX ORDER — INSULIN LISPRO 100 [IU]/ML
INJECTION, SOLUTION INTRAVENOUS; SUBCUTANEOUS
COMMUNITY
Start: 2024-08-16

## 2024-09-23 RX ORDER — SEMAGLUTIDE 2.68 MG/ML
0.25 INJECTION, SOLUTION SUBCUTANEOUS
COMMUNITY

## 2024-09-23 RX ORDER — CYCLOBENZAPRINE HCL 5 MG
1 TABLET ORAL EVERY 12 HOURS SCHEDULED
COMMUNITY
Start: 2024-08-09

## 2024-09-26 LAB
CONV .: NORMAL
CYTOLOGIST CVX/VAG CYTO: NORMAL
CYTOLOGY CVX/VAG DOC CYTO: NORMAL
CYTOLOGY CVX/VAG DOC THIN PREP: NORMAL
DX ICD CODE: NORMAL
Lab: NORMAL
OTHER STN SPEC: NORMAL
STAT OF ADQ CVX/VAG CYTO-IMP: NORMAL

## 2024-10-02 ENCOUNTER — CLINICAL SUPPORT (OUTPATIENT)
Dept: GASTROENTEROLOGY | Facility: CLINIC | Age: 44
End: 2024-10-02
Payer: COMMERCIAL

## 2024-10-02 ENCOUNTER — PREP FOR SURGERY (OUTPATIENT)
Dept: OTHER | Facility: HOSPITAL | Age: 44
End: 2024-10-02
Payer: COMMERCIAL

## 2024-10-02 DIAGNOSIS — Z12.11 COLON CANCER SCREENING: Primary | ICD-10-CM

## 2024-10-02 RX ORDER — BUPROPION HYDROCHLORIDE 150 MG/1
TABLET ORAL
COMMUNITY
Start: 2024-08-30

## 2024-10-02 RX ORDER — BUSPIRONE HYDROCHLORIDE 10 MG/1
TABLET ORAL
COMMUNITY
Start: 2024-06-01

## 2024-10-02 RX ORDER — ACYCLOVIR 400 MG/1
TABLET ORAL
COMMUNITY
Start: 2024-08-13

## 2024-10-02 RX ORDER — BUPROPION HYDROCHLORIDE 300 MG/1
TABLET ORAL
COMMUNITY
Start: 2024-08-30

## 2024-10-02 RX ORDER — SODIUM PICOSULFATE, MAGNESIUM OXIDE, AND ANHYDROUS CITRIC ACID 12; 3.5; 1 G/175ML; G/175ML; MG/175ML
175 LIQUID ORAL TAKE AS DIRECTED
Qty: 350 ML | Refills: 0 | Status: SHIPPED | OUTPATIENT
Start: 2024-10-02

## 2024-10-02 NOTE — PROGRESS NOTES
Jackie Wetzel Walter  1980  43 y.o.    Reason for call: Screening Colonoscopy  Prep prescribed: Clenpiq  Prep instructions reviewed with patient and sent to patient via [employer]  Is the patient currently on any injectable or oral medications for weight loss or diabetes? Yes  Clearance needed? No  If yes, what clearance is needed? N/A  Clearance has been requested from N/A  The patient has been scheduled for: Colonoscopy  After your procedure, you will be contacted with results. Please confirm the best phone # to reach the patient: [phone]  Family history of colon cancer? No  If yes, indicate relative: N/A  Tentative Procedure Date: 12/3/2024    Family History   Problem Relation Age of Onset    Breast cancer Mother 46    Hypertension Mother     Hypertension Father     Ovarian cancer Maternal Grandmother     Uterine cancer Maternal Grandmother 72    Melanoma Maternal Grandmother     Colon cancer Neg Hx     Prostate cancer Neg Hx     Clotting disorder Neg Hx     Deep vein thrombosis Neg Hx     Pulmonary embolism Neg Hx      Past Medical History:   Diagnosis Date    Abnormal Pap smear of cervix 2003    Acid reflux     ADD (attention deficit disorder)     Anxiety     Cholelithiasis 6/1996    Depression     HPV (human papilloma virus) infection     Migraine     Pancreatitis 6/1996    Type 1 diabetes      No Known Allergies  Past Surgical History:   Procedure Laterality Date    BARIATRIC SURGERY  4/18/2018    Gastric sleeve    CERVICAL BIOPSY  W/ LOOP ELECTRODE EXCISION      2004    CHOLECYSTECTOMY  6/1996    GASTRIC SLEEVE LAPAROSCOPIC      NECK SURGERY      UPPER GASTROINTESTINAL ENDOSCOPY       Social History     Socioeconomic History    Marital status:     Number of children: 1   Tobacco Use    Smoking status: Former     Current packs/day: 0.00     Average packs/day: 0.5 packs/day for 19.4 years (9.7 ttl pk-yrs)     Types: Cigarettes     Start date: 1/1/1994     Quit date: 6/4/2013     Years since  quittin.3     Passive exposure: Never    Smokeless tobacco: Never   Vaping Use    Vaping status: Never Used   Substance and Sexual Activity    Alcohol use: Yes     Alcohol/week: 1.0 standard drink of alcohol     Types: 1 Drinks containing 0.5 oz of alcohol per week     Comment: This is rare    Drug use: Never    Sexual activity: Yes     Partners: Male     Birth control/protection: Vasectomy     Comment: VAS.       Current Outpatient Medications:     buPROPion XL (WELLBUTRIN XL) 150 MG 24 hr tablet, , Disp: , Rfl:     buPROPion XL (WELLBUTRIN XL) 300 MG 24 hr tablet, , Disp: , Rfl:     busPIRone (BUSPAR) 10 MG tablet, , Disp: , Rfl:     citalopram (CeleXA) 40 MG tablet, Take 1 tablet by mouth Daily., Disp: , Rfl:     Continuous Glucose Sensor (Dexcom G7 Sensor) misc, , Disp: , Rfl:     cyclobenzaprine (FLEXERIL) 5 MG tablet, Take 1 tablet by mouth Every 12 (Twelve) Hours., Disp: , Rfl:     famotidine (PEPCID) 40 MG tablet, Take 1 tablet by mouth Daily With Breakfast., Disp: , Rfl:     HumaLOG KwikPen 100 UNIT/ML solution pen-injector, , Disp: , Rfl:     hydrOXYzine pamoate (VISTARIL) 100 MG capsule, 1 capsule., Disp: , Rfl:     insulin aspart (NovoLOG FlexPen) 100 UNIT/ML solution pen-injector sc pen, Novolog Flexpen U-100 Insulin aspart 100 unit/mL (3 mL) subcutaneous, Disp: , Rfl:     pantoprazole (PROTONIX) 40 MG EC tablet, Take 1 tablet by mouth Daily., Disp: , Rfl:     Semaglutide, 2 MG/DOSE, (Ozempic, 2 MG/DOSE,) 8 MG/3ML solution pen-injector, 0.25 mg., Disp: , Rfl:     tiZANidine (ZANAFLEX) 4 MG tablet, Take 1 tablet by mouth 2 (Two) Times a Day As Needed., Disp: , Rfl:     Tresiba FlexTouch 100 UNIT/ML solution pen-injector injection, inject 35 SUBCUTANEOUSLY EVERY NIGHT AT BEDTIME, Disp: , Rfl:

## 2024-11-08 ENCOUNTER — HOSPITAL ENCOUNTER (OUTPATIENT)
Dept: MAMMOGRAPHY | Facility: HOSPITAL | Age: 44
Discharge: HOME | End: 2024-11-08
Admitting: OBSTETRICS & GYNECOLOGY
Payer: COMMERCIAL

## 2024-11-08 DIAGNOSIS — Z01.419 WELL WOMAN EXAM WITH ROUTINE GYNECOLOGICAL EXAM: ICD-10-CM

## 2024-11-08 PROCEDURE — 77067 SCR MAMMO BI INCL CAD: CPT

## 2024-11-08 PROCEDURE — 77063 BREAST TOMOSYNTHESIS BI: CPT

## 2024-11-22 NOTE — PRE-PROCEDURE INSTRUCTIONS
Reminded of arrival time at 0630, Entrance C of the [facility] hospital. Instructed to bring or have a  over the age of 18 set up to drive you home the day of procedure.    Instructed on clear liquid diet the day before, nothing red or purple. Call with any questions about the prep or if in need of the prep.  Reminded them not to eat or drink anything am of procedure unless its a sip of water with medications.  Patient verbalized understanding. Instructed to hold Ozempic x7 days prior to procedure.

## 2024-12-02 ENCOUNTER — ANESTHESIA EVENT (OUTPATIENT)
Dept: GASTROENTEROLOGY | Facility: HOSPITAL | Age: 44
End: 2024-12-02
Payer: COMMERCIAL

## 2024-12-03 ENCOUNTER — ANESTHESIA (OUTPATIENT)
Dept: GASTROENTEROLOGY | Facility: HOSPITAL | Age: 44
End: 2024-12-03
Payer: COMMERCIAL

## 2024-12-03 ENCOUNTER — HOSPITAL ENCOUNTER (OUTPATIENT)
Facility: HOSPITAL | Age: 44
Setting detail: HOSPITAL OUTPATIENT SURGERY
Discharge: HOME | End: 2024-12-03
Attending: INTERNAL MEDICINE | Admitting: INTERNAL MEDICINE
Payer: COMMERCIAL

## 2024-12-03 VITALS
HEIGHT: 66 IN | OXYGEN SATURATION: 94 % | DIASTOLIC BLOOD PRESSURE: 84 MMHG | HEART RATE: 104 BPM | TEMPERATURE: 97.6 F | SYSTOLIC BLOOD PRESSURE: 123 MMHG | BODY MASS INDEX: 43.08 KG/M2 | WEIGHT: 268.08 LBS | RESPIRATION RATE: 18 BRPM

## 2024-12-03 LAB
B-HCG UR QL: NEGATIVE
GLUCOSE BLDC GLUCOMTR-MCNC: 190 MG/DL (ref 70–99)

## 2024-12-03 PROCEDURE — 82948 REAGENT STRIP/BLOOD GLUCOSE: CPT

## 2024-12-03 PROCEDURE — 25010000002 LIDOCAINE PF 2% 2 % SOLUTION: Performed by: NURSE ANESTHETIST, CERTIFIED REGISTERED

## 2024-12-03 PROCEDURE — 81025 URINE PREGNANCY TEST: CPT | Performed by: INTERNAL MEDICINE

## 2024-12-03 PROCEDURE — 25010000002 PROPOFOL 10 MG/ML EMULSION: Performed by: NURSE ANESTHETIST, CERTIFIED REGISTERED

## 2024-12-03 RX ORDER — LIDOCAINE HYDROCHLORIDE 20 MG/ML
INJECTION, SOLUTION EPIDURAL; INFILTRATION; INTRACAUDAL; PERINEURAL AS NEEDED
Status: DISCONTINUED | OUTPATIENT
Start: 2024-12-03 | End: 2024-12-03 | Stop reason: SURG

## 2024-12-03 RX ORDER — PROPOFOL 10 MG/ML
VIAL (ML) INTRAVENOUS AS NEEDED
Status: DISCONTINUED | OUTPATIENT
Start: 2024-12-03 | End: 2024-12-03 | Stop reason: SURG

## 2024-12-03 RX ADMIN — PROPOFOL 200 MCG/KG/MIN: 10 INJECTION, EMULSION INTRAVENOUS at 08:05

## 2024-12-03 RX ADMIN — LIDOCAINE HYDROCHLORIDE 50 MG: 20 INJECTION, SOLUTION INTRAVENOUS at 08:05

## 2024-12-03 RX ADMIN — PROPOFOL 100 MG: 10 INJECTION, EMULSION INTRAVENOUS at 08:05

## 2024-12-03 NOTE — ANESTHESIA POSTPROCEDURE EVALUATION
Patient: Jackie Watson    Procedure Summary       Date: 12/03/24 Room / Location: [facility] END[facility]COPY 2 / [facility]    Anesthesia Start: 0801 Anesthesia Stop: 0828    Procedure: COLONOSCOPY Diagnosis:       Colon cancer screening      (Colon cancer screening [Z12.11])    Surgeons: Chandler Cordero MD Provider: Chanelle Montanez CRNA    Anesthesia Type: general ASA Status: 3            Anesthesia Type: general    Vitals  Vitals Value Taken Time   /84 12/03/24 0842   Temp 36.4 °C (97.6 °F) 12/03/24 0842   Pulse 101 12/03/24 0845   Resp 18 12/03/24 0842   SpO2 96 % 12/03/24 0845   Vitals shown include unfiled device data.        Post Anesthesia Care and Evaluation    Patient location during evaluation: bedside  Patient participation: complete - patient participated  Level of consciousness: awake  Pain management: adequate    Airway patency: patent  Anesthetic complications: No anesthetic complications  PONV Status: controlled  Cardiovascular status: acceptable and stable  Respiratory status: acceptable

## 2024-12-03 NOTE — H&P
ScreeningPre Procedure History & Physical    Chief Complaint:   Screening     Subjective     HPI:   Screening     Past Medical History:   Past Medical History:   Diagnosis Date    Abnormal Pap smear of cervix 2003    Acid reflux     ADD (attention deficit disorder)     Anxiety     Cholelithiasis 6/1996    Depression     HPV (human papilloma virus) infection     Migraine     Pancreatitis 6/1996    Type 1 diabetes        Past Surgical History:  Past Surgical History:   Procedure Laterality Date    BARIATRIC SURGERY  4/18/2018    Gastric sleeve    CERVICAL BIOPSY  W/ LOOP ELECTRODE EXCISION      2004    CHOLECYSTECTOMY  6/1996    GASTRIC SLEEVE LAPAROSCOPIC      NECK SURGERY      UPPER GASTROINTESTINAL ENDOSCOPY         Family History:  Family History   Problem Relation Age of Onset    Breast cancer Mother 46    Hypertension Mother     Hypertension Father     Ovarian cancer Maternal Grandmother     Uterine cancer Maternal Grandmother 72    Melanoma Maternal Grandmother     Colon cancer Neg Hx     Prostate cancer Neg Hx     Clotting disorder Neg Hx     Deep vein thrombosis Neg Hx     Pulmonary embolism Neg Hx        Social History:   reports that she quit smoking about 11 years ago. Her smoking use included cigarettes. She started smoking about 30 years ago. She has a 9.7 pack-year smoking history. She has never been exposed to tobacco smoke. She has never used smokeless tobacco. She reports current alcohol use of about 1.0 standard drink of alcohol per week. She reports that she does not use drugs.    Medications:   Medications Prior to Admission   Medication Sig Dispense Refill Last Dose/Taking    buPROPion XL (WELLBUTRIN XL) 150 MG 24 hr tablet    12/2/2024    buPROPion XL (WELLBUTRIN XL) 300 MG 24 hr tablet    12/2/2024    busPIRone (BUSPAR) 10 MG tablet    Past Week    citalopram (CeleXA) 40 MG tablet Take 1 tablet by mouth Daily.   12/2/2024    famotidine (PEPCID) 40 MG tablet Take 1 tablet by mouth Daily With  "Breakfast.   12/2/2024    HumaLOG KwikPen 100 UNIT/ML solution pen-injector    12/2/2024    hydrOXYzine pamoate (VISTARIL) 100 MG capsule 1 capsule.   Past Week    insulin aspart (NovoLOG FlexPen) 100 UNIT/ML solution pen-injector sc pen Novolog Flexpen U-100 Insulin aspart 100 unit/mL (3 mL) subcutaneous   12/2/2024    pantoprazole (PROTONIX) 40 MG EC tablet Take 1 tablet by mouth Daily.   12/2/2024    Continuous Glucose Sensor (Dexcom G7 Sensor) misc        cyclobenzaprine (FLEXERIL) 5 MG tablet Take 1 tablet by mouth Every 12 (Twelve) Hours.   More than a month    Semaglutide, 2 MG/DOSE, (Ozempic, 2 MG/DOSE,) 8 MG/3ML solution pen-injector 0.25 mg.   11/24/2024    Sod Picosulfate-Mag Ox-Cit Acd (Clenpiq) 10-3.5-12 MG-GM -GM/175ML solution Take 175 mL by mouth Take As Directed. 350 mL 0     tiZANidine (ZANAFLEX) 4 MG tablet Take 1 tablet by mouth 2 (Two) Times a Day As Needed.   More than a month    Tresiba FlexTouch 100 UNIT/ML solution pen-injector injection inject 35 SUBCUTANEOUSLY EVERY NIGHT AT BEDTIME          Allergies:  Patient has no known allergies.        Objective     Blood pressure 132/81, pulse 106, temperature 96.5 °F (35.8 °C), temperature source Temporal, resp. rate 20, height 167.6 cm (66\"), weight 122 kg (268 lb 1.3 oz), SpO2 95%, not currently breastfeeding.    Physical Exam   Constitutional: Pt is oriented to person, place, and time and well-developed, well-nourished, and in no distress.   Mouth/Throat: Oropharynx is clear and moist.   Neck: Normal range of motion.   Cardiovascular: Normal rate, regular rhythm and normal heart sounds.    Pulmonary/Chest: Effort normal and breath sounds normal.   Abdominal: Soft. Nontender  Skin: Skin is warm and dry.   Psychiatric: Mood, memory, affect and judgment normal.     Assessment & Plan     Diagnosis:  Screening colonoscopy       Anticipated Surgical Procedure:  colonoscopy    The risks, benefits, and alternatives of this procedure have been discussed " with the patient or the responsible party- the patient understands and agrees to proceed.

## 2024-12-04 ENCOUNTER — TELEPHONE (OUTPATIENT)
Dept: GASTROENTEROLOGY | Facility: CLINIC | Age: 44
End: 2024-12-04
Payer: COMMERCIAL

## (undated) DEVICE — SOLIDIFIER LIQLOC PLS 1500CC BT

## (undated) DEVICE — CONN JET HYDRA H20 AUXILIARY DISP

## (undated) DEVICE — Device

## (undated) DEVICE — Device: Brand: DEFENDO AIR/WATER/SUCTION AND BIOPSY VALVE

## (undated) DEVICE — SOL IRRG H2O PL/BG 1000ML STRL

## (undated) DEVICE — LINER SURG CANSTR SXN S/RIGD 1500CC